# Patient Record
Sex: FEMALE | Race: WHITE | NOT HISPANIC OR LATINO | URBAN - METROPOLITAN AREA
[De-identification: names, ages, dates, MRNs, and addresses within clinical notes are randomized per-mention and may not be internally consistent; named-entity substitution may affect disease eponyms.]

---

## 2022-03-18 ENCOUNTER — OFFICE VISIT (OUTPATIENT)
Dept: URBAN - METROPOLITAN AREA CLINIC 98 | Facility: CLINIC | Age: 42
End: 2022-03-18
Payer: COMMERCIAL

## 2022-03-18 ENCOUNTER — WEB ENCOUNTER (OUTPATIENT)
Dept: URBAN - METROPOLITAN AREA CLINIC 98 | Facility: CLINIC | Age: 42
End: 2022-03-18

## 2022-03-18 VITALS
SYSTOLIC BLOOD PRESSURE: 140 MMHG | HEIGHT: 69 IN | WEIGHT: 154.6 LBS | HEART RATE: 96 BPM | DIASTOLIC BLOOD PRESSURE: 96 MMHG | TEMPERATURE: 96.8 F | BODY MASS INDEX: 22.9 KG/M2

## 2022-03-18 DIAGNOSIS — R19.5 LOOSE BOWEL MOVEMENTS: ICD-10-CM

## 2022-03-18 DIAGNOSIS — R53.83 TIREDNESS: ICD-10-CM

## 2022-03-18 DIAGNOSIS — K50.80 CROHN'S COLITIS: ICD-10-CM

## 2022-03-18 PROCEDURE — 99244 OFF/OP CNSLTJ NEW/EST MOD 40: CPT | Performed by: INTERNAL MEDICINE

## 2022-03-18 PROCEDURE — 99204 OFFICE O/P NEW MOD 45 MIN: CPT | Performed by: INTERNAL MEDICINE

## 2022-03-18 RX ORDER — HYDROXYZINE PAMOATE 25 MG/1
BID CAPSULE ORAL
Qty: 0 | Refills: 0 | Status: ACTIVE | COMMUNITY
Start: 1900-01-01 | End: 1900-01-01

## 2022-03-18 RX ORDER — FLUOXETINE 20 MG/1
TAKE 1 CAPSULE (20 MG) BY ORAL ROUTE ONCE DAILY CAPSULE ORAL 1
Qty: 0 | Refills: 0 | Status: ACTIVE | COMMUNITY
Start: 1900-01-01 | End: 1900-01-01

## 2022-03-18 RX ORDER — SODIUM, POTASSIUM,MAG SULFATES 17.5-3.13G
17.5-13.3-1.6 GM/177ML SOLUTION, RECONSTITUTED, ORAL ORAL
Qty: 1 BOX | Refills: 0 | OUTPATIENT
Start: 2022-03-18 | End: 2022-03-19

## 2022-03-18 RX ORDER — LISDEXAMFETAMINE DIMESYLATE 60 MG/1
TAKE 1 CAPSULE (60 MG) BY ORAL ROUTE ONCE DAILY IN THE MORNING CAPSULE ORAL 1
Qty: 0 | Refills: 0 | Status: ACTIVE | COMMUNITY
Start: 1900-01-01 | End: 1900-01-01

## 2022-03-18 NOTE — HPI-TODAY'S VISIT:
Patient referred by Dr. 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  Last colon in 2018 and was normal.  Used apriso prn Tiredness issues- last iron infusion 2-3 years ago.  Does have heavy periods. Last labs a year ago.  Loose upto 6-8 BM per day- present since surgery.  More urgency though.  No abdominal pain No weight loss. Intermittent GERD

## 2022-03-19 LAB
A/G RATIO: 2
ALBUMIN: 4.9
ALKALINE PHOSPHATASE: 52
ALT (SGPT): 17
AST (SGOT): 21
BASO (ABSOLUTE): 0
BASOS: 0
BILIRUBIN, TOTAL: 0.3
BUN/CREATININE RATIO: 14
BUN: 11
C-REACTIVE PROTEIN, QUANT: <1
CALCIUM: 9.8
CARBON DIOXIDE, TOTAL: 22
CHLORIDE: 101
CREATININE: 0.77
EGFR: 101
EOS (ABSOLUTE): 0.1
EOS: 3
FERRITIN, SERUM: 12
FOLATE (FOLIC ACID), SERUM: >20
GLOBULIN, TOTAL: 2.5
GLUCOSE: 94
HEMATOCRIT: 39.8
HEMATOLOGY COMMENTS:: (no result)
HEMOGLOBIN: 13.5
IMMATURE CELLS: (no result)
IMMATURE GRANS (ABS): 0
IMMATURE GRANULOCYTES: 0
IRON BIND.CAP.(TIBC): 439
IRON SATURATION: 21
IRON: 92
LYMPHS (ABSOLUTE): 1.5
LYMPHS: 28
MCH: 29.5
MCHC: 33.9
MCV: 87
MONOCYTES(ABSOLUTE): 0.3
MONOCYTES: 6
NEUTROPHILS (ABSOLUTE): 3.2
NEUTROPHILS: 63
NRBC: (no result)
PLATELETS: 294
POTASSIUM: 4.1
PROTEIN, TOTAL: 7.4
RBC: 4.58
RDW: 12.7
SEDIMENTATION RATE-WESTERGREN: 4
SODIUM: 141
UIBC: 347
VITAMIN B12: 969
VITAMIN D, 25-HYDROXY: 32
WBC: 5.1

## 2022-05-12 PROBLEM — 426549001: Status: ACTIVE | Noted: 2022-03-18

## 2022-05-23 ENCOUNTER — TELEPHONE ENCOUNTER (OUTPATIENT)
Dept: URBAN - METROPOLITAN AREA CLINIC 98 | Facility: CLINIC | Age: 42
End: 2022-05-23

## 2022-05-24 ENCOUNTER — CLAIMS CREATED FROM THE CLAIM WINDOW (OUTPATIENT)
Dept: URBAN - METROPOLITAN AREA SURGERY CENTER 18 | Facility: SURGERY CENTER | Age: 42
End: 2022-05-24

## 2022-05-24 ENCOUNTER — CLAIMS CREATED FROM THE CLAIM WINDOW (OUTPATIENT)
Dept: URBAN - METROPOLITAN AREA SURGERY CENTER 18 | Facility: SURGERY CENTER | Age: 42
End: 2022-05-24
Payer: COMMERCIAL

## 2022-05-24 DIAGNOSIS — K50.00 CICATRIZING ENTEROCOLITIS: ICD-10-CM

## 2022-05-24 PROCEDURE — 45380 COLONOSCOPY AND BIOPSY: CPT | Performed by: INTERNAL MEDICINE

## 2022-05-24 PROCEDURE — G8907 PT DOC NO EVENTS ON DISCHARG: HCPCS | Performed by: INTERNAL MEDICINE

## 2022-05-24 RX ORDER — HYDROXYZINE PAMOATE 25 MG/1
BID CAPSULE ORAL
Qty: 0 | Refills: 0 | Status: ACTIVE | COMMUNITY
Start: 1900-01-01 | End: 1900-01-01

## 2022-05-24 RX ORDER — LISDEXAMFETAMINE DIMESYLATE 60 MG/1
TAKE 1 CAPSULE (60 MG) BY ORAL ROUTE ONCE DAILY IN THE MORNING CAPSULE ORAL 1
Qty: 0 | Refills: 0 | Status: ACTIVE | COMMUNITY
Start: 1900-01-01 | End: 1900-01-01

## 2022-05-24 RX ORDER — FLUOXETINE 20 MG/1
TAKE 1 CAPSULE (20 MG) BY ORAL ROUTE ONCE DAILY CAPSULE ORAL 1
Qty: 0 | Refills: 0 | Status: ACTIVE | COMMUNITY
Start: 1900-01-01 | End: 1900-01-01

## 2024-01-11 ENCOUNTER — OFFICE VISIT (OUTPATIENT)
Dept: URBAN - METROPOLITAN AREA CLINIC 98 | Facility: CLINIC | Age: 44
End: 2024-01-11
Payer: COMMERCIAL

## 2024-01-11 ENCOUNTER — LAB OUTSIDE AN ENCOUNTER (OUTPATIENT)
Dept: URBAN - METROPOLITAN AREA CLINIC 98 | Facility: CLINIC | Age: 44
End: 2024-01-11

## 2024-01-11 ENCOUNTER — DASHBOARD ENCOUNTERS (OUTPATIENT)
Age: 44
End: 2024-01-11

## 2024-01-11 VITALS
WEIGHT: 154 LBS | DIASTOLIC BLOOD PRESSURE: 80 MMHG | BODY MASS INDEX: 22.81 KG/M2 | SYSTOLIC BLOOD PRESSURE: 128 MMHG | TEMPERATURE: 97 F | HEIGHT: 69 IN | HEART RATE: 72 BPM

## 2024-01-11 DIAGNOSIS — E61.1 IRON DEFICIENCY: ICD-10-CM

## 2024-01-11 DIAGNOSIS — R19.5 LOOSE BOWEL MOVEMENTS: ICD-10-CM

## 2024-01-11 DIAGNOSIS — K50.90 CROHN'S DISEASE IN REMISSION: ICD-10-CM

## 2024-01-11 DIAGNOSIS — R53.83 TIREDNESS: ICD-10-CM

## 2024-01-11 PROCEDURE — 99214 OFFICE O/P EST MOD 30 MIN: CPT | Performed by: INTERNAL MEDICINE

## 2024-01-11 RX ORDER — LISDEXAMFETAMINE DIMESYLATE 60 MG/1
TAKE 1 CAPSULE (60 MG) BY ORAL ROUTE ONCE DAILY IN THE MORNING CAPSULE ORAL 1
Qty: 0 | Refills: 0 | Status: ACTIVE | COMMUNITY
Start: 1900-01-01

## 2024-01-11 RX ORDER — HYDROXYZINE PAMOATE 25 MG/1
BID CAPSULE ORAL
Qty: 0 | Refills: 0 | Status: ON HOLD | COMMUNITY
Start: 1900-01-01

## 2024-01-11 RX ORDER — SODIUM, POTASSIUM,MAG SULFATES 17.5-3.13G
177ML SOLUTION, RECONSTITUTED, ORAL ORAL
Qty: 177 MILLILITER | Refills: 0 | OUTPATIENT
Start: 2024-01-11 | End: 2024-01-12

## 2024-01-11 RX ORDER — FLUOXETINE 20 MG/1
TAKE 1 CAPSULE (20 MG) BY ORAL ROUTE ONCE DAILY CAPSULE ORAL 1
Qty: 0 | Refills: 0 | Status: ACTIVE | COMMUNITY
Start: 1900-01-01

## 2024-01-11 NOTE — HPI-TODAY'S VISIT:
Patient referred by Dr. 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  Last colon in 2018 and was normal.  Used apriso prn Tiredness issues- last iron infusion 2-3 years ago.  Does have heavy periods. Last labs a year ago.  Loose upto 6-8 BM per day- present since surgery.  More urgency though.  No abdominal pain No weight loss. Intermittent GERD  Present - found to be low iron - some low energy.  - off all crohn's meds for years.  - daughter with crohn's - last colon in 2022 and was normal - has tken infusion iron in the past.  - BM can be 3-6 per day - loose - does have heavy menstrual periods.

## 2024-01-12 LAB
BASO (ABSOLUTE): 0
BASOS: 0
C-REACTIVE PROTEIN, QUANT: <1
EOS (ABSOLUTE): 0.1
EOS: 2
FERRITIN, SERUM: 5
HEMATOCRIT: 34.7
HEMATOLOGY COMMENTS:: (no result)
HEMOGLOBIN: 11.2
IMMATURE CELLS: (no result)
IMMATURE GRANS (ABS): 0
IMMATURE GRANULOCYTES: 0
IRON BIND.CAP.(TIBC): 507
IRON SATURATION: 5
IRON: 24
LYMPHS (ABSOLUTE): 1.4
LYMPHS: 28
MCH: 25.6
MCHC: 32.3
MCV: 79
MONOCYTES(ABSOLUTE): 0.3
MONOCYTES: 6
NEUTROPHILS (ABSOLUTE): 3.4
NEUTROPHILS: 64
NRBC: (no result)
PLATELETS: 294
RBC: 4.38
RDW: 15.7
UIBC: 483
WBC: 5.2

## 2024-02-06 ENCOUNTER — COL/EGD (OUTPATIENT)
Dept: URBAN - METROPOLITAN AREA SURGERY CENTER 18 | Facility: SURGERY CENTER | Age: 44
End: 2024-02-06
Payer: COMMERCIAL

## 2024-02-06 DIAGNOSIS — K29.60 ADENOPAPILLOMATOSIS GASTRICA: ICD-10-CM

## 2024-02-06 DIAGNOSIS — K50.90 ABDOMINAL PAIN DESPITE THERAPY FOR CROHN'S DISEASE: ICD-10-CM

## 2024-02-06 DIAGNOSIS — D50.9 ANEMIA: ICD-10-CM

## 2024-02-06 PROCEDURE — G8907 PT DOC NO EVENTS ON DISCHARG: HCPCS | Performed by: INTERNAL MEDICINE

## 2024-02-06 PROCEDURE — 45380 COLONOSCOPY AND BIOPSY: CPT | Performed by: INTERNAL MEDICINE

## 2024-02-06 PROCEDURE — 43239 EGD BIOPSY SINGLE/MULTIPLE: CPT | Performed by: INTERNAL MEDICINE

## 2024-02-06 RX ORDER — FLUOXETINE 20 MG/1
TAKE 1 CAPSULE (20 MG) BY ORAL ROUTE ONCE DAILY CAPSULE ORAL 1
Qty: 0 | Refills: 0 | Status: ACTIVE | COMMUNITY
Start: 1900-01-01

## 2024-02-06 RX ORDER — HYDROXYZINE PAMOATE 25 MG/1
BID CAPSULE ORAL
Qty: 0 | Refills: 0 | Status: ON HOLD | COMMUNITY
Start: 1900-01-01

## 2024-02-06 RX ORDER — LISDEXAMFETAMINE DIMESYLATE 60 MG/1
TAKE 1 CAPSULE (60 MG) BY ORAL ROUTE ONCE DAILY IN THE MORNING CAPSULE ORAL 1
Qty: 0 | Refills: 0 | Status: ACTIVE | COMMUNITY
Start: 1900-01-01